# Patient Record
Sex: FEMALE | Race: WHITE
[De-identification: names, ages, dates, MRNs, and addresses within clinical notes are randomized per-mention and may not be internally consistent; named-entity substitution may affect disease eponyms.]

---

## 2018-03-09 ENCOUNTER — HOSPITAL ENCOUNTER (EMERGENCY)
Dept: HOSPITAL 47 - EC | Age: 83
LOS: 1 days | Discharge: HOME | End: 2018-03-10
Payer: MEDICARE

## 2018-03-09 VITALS — RESPIRATION RATE: 18 BRPM

## 2018-03-09 DIAGNOSIS — F41.9: Primary | ICD-10-CM

## 2018-03-09 DIAGNOSIS — Z91.048: ICD-10-CM

## 2018-03-09 DIAGNOSIS — I25.10: ICD-10-CM

## 2018-03-09 DIAGNOSIS — E07.9: ICD-10-CM

## 2018-03-09 DIAGNOSIS — K21.9: ICD-10-CM

## 2018-03-09 DIAGNOSIS — I11.9: ICD-10-CM

## 2018-03-09 DIAGNOSIS — Z86.73: ICD-10-CM

## 2018-03-09 DIAGNOSIS — E78.5: ICD-10-CM

## 2018-03-09 DIAGNOSIS — Z79.82: ICD-10-CM

## 2018-03-09 DIAGNOSIS — R11.0: ICD-10-CM

## 2018-03-09 DIAGNOSIS — Z88.0: ICD-10-CM

## 2018-03-09 DIAGNOSIS — I25.2: ICD-10-CM

## 2018-03-09 DIAGNOSIS — Z79.899: ICD-10-CM

## 2018-03-09 DIAGNOSIS — E78.00: ICD-10-CM

## 2018-03-09 DIAGNOSIS — R07.9: ICD-10-CM

## 2018-03-09 LAB
ALBUMIN SERPL-MCNC: 4 G/DL (ref 3.5–5)
ALP SERPL-CCNC: 72 U/L (ref 38–126)
ALT SERPL-CCNC: 41 U/L (ref 9–52)
ANION GAP SERPL CALC-SCNC: 10 MMOL/L
APTT BLD: 22 SEC (ref 22–30)
AST SERPL-CCNC: 54 U/L (ref 14–36)
BASOPHILS # BLD AUTO: 0 K/UL (ref 0–0.2)
BASOPHILS NFR BLD AUTO: 0 %
BUN SERPL-SCNC: 17 MG/DL (ref 7–17)
CALCIUM SPEC-MCNC: 9.1 MG/DL (ref 8.4–10.2)
CHLORIDE SERPL-SCNC: 104 MMOL/L (ref 98–107)
CK SERPL-CCNC: 55 U/L (ref 30–135)
CO2 SERPL-SCNC: 24 MMOL/L (ref 22–30)
EOSINOPHIL # BLD AUTO: 0.1 K/UL (ref 0–0.7)
EOSINOPHIL NFR BLD AUTO: 1 %
ERYTHROCYTE [DISTWIDTH] IN BLOOD BY AUTOMATED COUNT: 4.76 M/UL (ref 3.8–5.4)
ERYTHROCYTE [DISTWIDTH] IN BLOOD: 12.3 % (ref 11.5–15.5)
GLUCOSE SERPL-MCNC: 127 MG/DL (ref 74–99)
HCT VFR BLD AUTO: 40.6 % (ref 34–46)
HGB BLD-MCNC: 13.6 GM/DL (ref 11.4–16)
INR PPP: 1.1 (ref ?–1.2)
LYMPHOCYTES # SPEC AUTO: 1 K/UL (ref 1–4.8)
LYMPHOCYTES NFR SPEC AUTO: 18 %
MAGNESIUM SPEC-SCNC: 2 MG/DL (ref 1.6–2.3)
MCH RBC QN AUTO: 28.7 PG (ref 25–35)
MCHC RBC AUTO-ENTMCNC: 33.6 G/DL (ref 31–37)
MCV RBC AUTO: 85.3 FL (ref 80–100)
MONOCYTES # BLD AUTO: 0.4 K/UL (ref 0–1)
MONOCYTES NFR BLD AUTO: 7 %
NEUTROPHILS # BLD AUTO: 4.1 K/UL (ref 1.3–7.7)
NEUTROPHILS NFR BLD AUTO: 73 %
PLATELET # BLD AUTO: 170 K/UL (ref 150–450)
POTASSIUM SERPL-SCNC: 3.9 MMOL/L (ref 3.5–5.1)
PROT SERPL-MCNC: 6.6 G/DL (ref 6.3–8.2)
PT BLD: 10.8 SEC (ref 9–12)
SODIUM SERPL-SCNC: 138 MMOL/L (ref 137–145)
TROPONIN I SERPL-MCNC: <0.012 NG/ML (ref 0–0.03)
WBC # BLD AUTO: 5.7 K/UL (ref 3.8–10.6)

## 2018-03-09 PROCEDURE — 71046 X-RAY EXAM CHEST 2 VIEWS: CPT

## 2018-03-09 PROCEDURE — 85610 PROTHROMBIN TIME: CPT

## 2018-03-09 PROCEDURE — 80053 COMPREHEN METABOLIC PANEL: CPT

## 2018-03-09 PROCEDURE — 82553 CREATINE MB FRACTION: CPT

## 2018-03-09 PROCEDURE — 83735 ASSAY OF MAGNESIUM: CPT

## 2018-03-09 PROCEDURE — 96375 TX/PRO/DX INJ NEW DRUG ADDON: CPT

## 2018-03-09 PROCEDURE — 84484 ASSAY OF TROPONIN QUANT: CPT

## 2018-03-09 PROCEDURE — 82550 ASSAY OF CK (CPK): CPT

## 2018-03-09 PROCEDURE — 85730 THROMBOPLASTIN TIME PARTIAL: CPT

## 2018-03-09 PROCEDURE — 85025 COMPLETE CBC W/AUTO DIFF WBC: CPT

## 2018-03-09 PROCEDURE — 96374 THER/PROPH/DIAG INJ IV PUSH: CPT

## 2018-03-09 PROCEDURE — 36415 COLL VENOUS BLD VENIPUNCTURE: CPT

## 2018-03-09 PROCEDURE — 99285 EMERGENCY DEPT VISIT HI MDM: CPT

## 2018-03-09 PROCEDURE — 93005 ELECTROCARDIOGRAM TRACING: CPT

## 2018-03-09 NOTE — ED
General Adult HPI





- General


Chief complaint: Chest Pain


Stated complaint: Weakness/Tremors


Time Seen by Provider: 18 21:12


Source: patient, RN notes reviewed, old records reviewed


Mode of arrival: EMS


Limitations: no limitations





- History of Present Illness


Initial comments: 





89-year-old female to the ER with multiple nonspecific complaints.  Patient 

complaints of lightheadedness and dizziness and mild trembling.  She has 

multiple medical issues including cardiac disease.  Also source of high blood 

pressure high cholesterol.  Patient having mild chest pain.  Patient states she 

was doing nothing abnormal watching TV with no symptoms or complaints.  Blood 

pressure began to fluctuate a little bit, she felt dizzy and had some tingling 

in her arms.  At this time  states some tingling in her right arm, some 

nausea, denies chest pain or any pain in general.  Recent fever cough or 

congestion, patient states she's been feeling well as of late





- Related Data


 Home Medications











 Medication  Instructions  Recorded  Confirmed


 


ALPRAZolam [Xanax] 0.5 mg PO HS PRN 16


 


Aspirin 162 mg PO HS 16


 


Hydrocodone/Acetaminophen [Lortab 1 tab PO Q6HR PRN 16





7.5-325 mg Tablet]   


 


Isosorbide Mononitrate ER [Imdur] 30 mg PO BID 16


 


Metoprolol Tartrate [Lopressor] 50 mg PO BID 16


 


Omeprazole [PriLOSEC] 20 mg PO AC-BRKFST 16


 


Rosuvastatin Calcium [Crestor] 5 mg PO QAM 16


 


amLODIPine [Norvasc] 5 mg PO BID 16


 


Cholecalciferol [Vitamin D3] 2,000 unit PO HS 16


 


Levothyroxine Sodium [Synthroid] 137 mcg PO [id number] 10/13/16 03/09/18


 


Valsartan [Diovan] 160 mg PO BID 10/13/16 03/09/18


 


FLUoxetine HCL [PROzac] 10 mg PO DAILY 18











 Allergies











Allergy/AdvReac Type Severity Reaction Status Date / Time


 


dog dander Allergy  Unknown Verified 18 21:28


 


Penicillins Allergy  Unknown Verified 18 21:28














Review of Systems


ROS Statement: 


Those systems with pertinent positive or pertinent negative responses have been 

documented in the HPI.





ROS Other: All systems not noted in ROS Statement are negative.





Past Medical History


Past Medical History: Coronary Artery Disease (CAD), GERD/Reflux, Hyperlipidemia

, Hypertension, Myocardial Infarction (MI), Osteoarthritis (OA), Thyroid 

Disorder


Additional Past Medical History / Comment(s): glaucoma, hernia, hypercholetremia

, diverticulousis, back pain, CAD status post PCI, hypertension and 

hypertensive cardiovascular disease, osteoarthritis, recurrent TIA.


Last Myocardial Infarction Date:: 


History of Any Multi-Drug Resistant Organisms: None Reported


Past Surgical History: Back Surgery, Bladder Surgery, Hysterectomy, Orthopedic 

Surgery


Additional Past Surgical History / Comment(s): Left heart catheterization with 

PCI 3, bilateral cataract surgery, right glaucoma surgery, bladder suspension 

2, initial partial hysterectomy, followed by oophorectomy with salpingectomy, 

then the rest of salpingectomy and oophorectomy, colonoscopy 5 years ago showed 

diverticulosis,


Past Anesthesia/Blood Transfusion Reactions: No Reported Reaction


Past Psychological History: Anxiety


Smoking Status: Never smoker


Past Alcohol Use History: None Reported


Past Drug Use History: None Reported





- Past Family History


  ** Father


Family Medical History: No Reported History (Father  at age of 71 from 

alcohol was in)





  ** Mother


Family Medical History: Vascular Disorder (Mother  at age of 84 from old 

age after she had suffered from competition after carotid endarterectomy.)





  ** Son(s)


Family Medical History: No Reported History (Patient has one son no major 

medical problems)





General Exam


Limitations: no limitations


General appearance: alert, in no apparent distress


Head exam: Present: atraumatic, normocephalic, normal inspection


Eye exam: Present: normal appearance, PERRL, EOMI.  Absent: scleral icterus, 

conjunctival injection, periorbital swelling


ENT exam: Present: normal exam, mucous membranes moist


Neck exam: Present: normal inspection.  Absent: tenderness, meningismus, 

lymphadenopathy


Respiratory exam: Present: normal lung sounds bilaterally.  Absent: respiratory 

distress, wheezes, rales, rhonchi, stridor


Cardiovascular Exam: Present: regular rate, normal rhythm, normal heart sounds.

  Absent: systolic murmur, diastolic murmur, rubs, gallop, clicks


GI/Abdominal exam: Present: soft, normal bowel sounds.  Absent: distended, 

tenderness, guarding, rebound, rigid


Extremities exam: Present: normal inspection, full ROM, normal capillary 

refill.  Absent: tenderness, pedal edema, joint swelling, calf tenderness


Back exam: Present: normal inspection


Neurological exam: Present: alert, oriented X3, CN II-XII intact


Psychiatric exam: Present: normal affect, normal mood


Skin exam: Present: warm, dry, intact, normal color.  Absent: rash





Course


 Vital Signs











  18





  21:16 22:49


 


Temperature 98.5 F 


 


Pulse Rate 64 80


 


Respiratory 18 18





Rate  


 


Blood Pressure 157/73 129/66


 


O2 Sat by Pulse 97 95





Oximetry  














- Reevaluation(s)


Reevaluation #1: 





18 23:14


Medical records reviewed


Reevaluation #2: 





18 23:14


Patient's nausea is improved


Reevaluation #3: 





03/10/18 00:11


Patient still denies any complaints like to be discharged home





EKG Findings





- EKG Comments:


EKG Findings:: EKG shows normal sinus rhythm rate of 63, , QRS 92, QTc 411





Medical Decision Making





- Medical Decision Making





87 female the ER nonspecific symptoms, patient feeling better, history of 

similar ER visits for same.  No chest pain feeling good and would like to be 

discharged home





- Lab Data


Result diagrams: 


 18 21:15





 18 21:15


 Lab Results











  18 Range/Units





  21:15 21:15 21:15 


 


WBC   5.7   (3.8-10.6)  k/uL


 


RBC   4.76   (3.80-5.40)  m/uL


 


Hgb   13.6   (11.4-16.0)  gm/dL


 


Hct   40.6   (34.0-46.0)  %


 


MCV   85.3   (80.0-100.0)  fL


 


MCH   28.7   (25.0-35.0)  pg


 


MCHC   33.6   (31.0-37.0)  g/dL


 


RDW   12.3   (11.5-15.5)  %


 


Plt Count   170   (150-450)  k/uL


 


Neutrophils %   73   %


 


Lymphocytes %   18   %


 


Monocytes %   7   %


 


Eosinophils %   1   %


 


Basophils %   0   %


 


Neutrophils #   4.1   (1.3-7.7)  k/uL


 


Lymphocytes #   1.0   (1.0-4.8)  k/uL


 


Monocytes #   0.4   (0-1.0)  k/uL


 


Eosinophils #   0.1   (0-0.7)  k/uL


 


Basophils #   0.0   (0-0.2)  k/uL


 


PT     (9.0-12.0)  sec


 


INR     (<1.2)  


 


APTT     (22.0-30.0)  sec


 


Sodium    138  (137-145)  mmol/L


 


Potassium    3.9  (3.5-5.1)  mmol/L


 


Chloride    104  ()  mmol/L


 


Carbon Dioxide    24  (22-30)  mmol/L


 


Anion Gap    10  mmol/L


 


BUN    17  (7-17)  mg/dL


 


Creatinine    0.60  (0.52-1.04)  mg/dL


 


Est GFR (CKD-EPI)AfAm    >90  (>60 ml/min/1.73 sqM)  


 


Est GFR (CKD-EPI)NonAf    82  (>60 ml/min/1.73 sqM)  


 


Glucose    127 H  (74-99)  mg/dL


 


Calcium    9.1  (8.4-10.2)  mg/dL


 


Magnesium    2.0  (1.6-2.3)  mg/dL


 


Total Bilirubin    0.8  (0.2-1.3)  mg/dL


 


AST    54 H  (14-36)  U/L


 


ALT    41  (9-52)  U/L


 


Alkaline Phosphatase    72  ()  U/L


 


Total Creatine Kinase  55    ()  U/L


 


CK-MB (CK-2)  0.5    (0.0-2.4)  ng/mL


 


CK-MB (CK-2) Rel Index  0.9    


 


Troponin I  <0.012    (0.000-0.034)  ng/mL


 


Total Protein    6.6  (6.3-8.2)  g/dL


 


Albumin    4.0  (3.5-5.0)  g/dL














  18 Range/Units





  21:15 


 


WBC   (3.8-10.6)  k/uL


 


RBC   (3.80-5.40)  m/uL


 


Hgb   (11.4-16.0)  gm/dL


 


Hct   (34.0-46.0)  %


 


MCV   (80.0-100.0)  fL


 


MCH   (25.0-35.0)  pg


 


MCHC   (31.0-37.0)  g/dL


 


RDW   (11.5-15.5)  %


 


Plt Count   (150-450)  k/uL


 


Neutrophils %   %


 


Lymphocytes %   %


 


Monocytes %   %


 


Eosinophils %   %


 


Basophils %   %


 


Neutrophils #   (1.3-7.7)  k/uL


 


Lymphocytes #   (1.0-4.8)  k/uL


 


Monocytes #   (0-1.0)  k/uL


 


Eosinophils #   (0-0.7)  k/uL


 


Basophils #   (0-0.2)  k/uL


 


PT  10.8  (9.0-12.0)  sec


 


INR  1.1  (<1.2)  


 


APTT  22.0  (22.0-30.0)  sec


 


Sodium   (137-145)  mmol/L


 


Potassium   (3.5-5.1)  mmol/L


 


Chloride   ()  mmol/L


 


Carbon Dioxide   (22-30)  mmol/L


 


Anion Gap   mmol/L


 


BUN   (7-17)  mg/dL


 


Creatinine   (0.52-1.04)  mg/dL


 


Est GFR (CKD-EPI)AfAm   (>60 ml/min/1.73 sqM)  


 


Est GFR (CKD-EPI)NonAf   (>60 ml/min/1.73 sqM)  


 


Glucose   (74-99)  mg/dL


 


Calcium   (8.4-10.2)  mg/dL


 


Magnesium   (1.6-2.3)  mg/dL


 


Total Bilirubin   (0.2-1.3)  mg/dL


 


AST   (14-36)  U/L


 


ALT   (9-52)  U/L


 


Alkaline Phosphatase   ()  U/L


 


Total Creatine Kinase   ()  U/L


 


CK-MB (CK-2)   (0.0-2.4)  ng/mL


 


CK-MB (CK-2) Rel Index   


 


Troponin I   (0.000-0.034)  ng/mL


 


Total Protein   (6.3-8.2)  g/dL


 


Albumin   (3.5-5.0)  g/dL














- Radiology Data


Radiology results: report reviewed (Chest x-rays negative for acute disease), 

image reviewed





Disposition


Clinical Impression: 


 Chest pain, Anxiety, Hypertension





Disposition: HOME SELF-CARE


Condition: Good


Instructions:  Chest Pain (ED), Hypertension (ED)


Referrals: 


Casie Solo MD [Primary Care Provider] - 1-2 days

## 2018-03-09 NOTE — XR
EXAMINATION TYPE: XR chest 2V

 

DATE OF EXAM: 3/9/2018

 

COMPARISON: 10/13/2016

 

HISTORY: Cough

 

TECHNIQUE:  Frontal and lateral views of the chest are obtained.

 

FINDINGS:  There is no heart failure nor confluent pneumonic infiltrate. Thoracic aorta is atheromato
us. There are chest leads. Costophrenic angles are clear. The bony thorax is intact.

 

IMPRESSION:  No active cardiopulmonary disease. Atheromatous aorta. No change.

## 2018-03-10 VITALS — HEART RATE: 72 BPM | DIASTOLIC BLOOD PRESSURE: 76 MMHG | SYSTOLIC BLOOD PRESSURE: 108 MMHG | TEMPERATURE: 98.3 F

## 2018-05-18 ENCOUNTER — HOSPITAL ENCOUNTER (OUTPATIENT)
Dept: HOSPITAL 47 - RADUSWWP | Age: 83
Discharge: HOME | End: 2018-05-18
Payer: MEDICARE

## 2018-05-18 DIAGNOSIS — R92.2: Primary | ICD-10-CM

## 2018-05-18 PROCEDURE — 77065 DX MAMMO INCL CAD UNI: CPT

## 2018-05-21 NOTE — MM
Reason for exam: additional evaluation requested from abnormal screening.

Last mammogram was performed 17 years and 2 months ago.



History:

Patient is postmenopausal.



Physical Findings:

Nurse did not find any significant physical abnormalities on exam.



MG Work Up Mamm w CAD LT

LM and spot compression MLO view(s) were taken of the left breast.

Prior study comparison: April 27, 2018, mammogram, performed at [facility].  June 9, 2009, mammogram, performed at [facility].

The breast tissue is heterogeneously dense. This may lower the sensitivity of 

mammography.  No suspicious abnormality. Inferior retroareolar asymmetry is 

present on the exam on 2009.



These results were verbally communicated with the patient and result sheet given 

to the patient on 5/18/18.





ASSESSMENT: Benign, BI-RAD 2



RECOMMENDATION:

Routine screening mammogram of both breasts in 1 year.

## 2018-05-21 NOTE — USB
Reason for exam: clinical finding.



History:

Patient is postmenopausal.



Physical Findings:

Nurse did not find any significant physical abnormalities on exam.



US Breast LT

Left complete breast ultrasound includes all four quadrants, the retroareolar 

region and axilla. Finding demonstrates no cystic or solid lesion seen.



These results were verbally communicated with the patient and result sheet given 

to the patient on 5/18/18.





ASSESSMENT: Incomplete: need additional imaging evaluation, BI-RAD 0



RECOMMENDATION:

Special view mammogram of the left breast.

(spot MLO and lateral)

## 2018-11-27 ENCOUNTER — HOSPITAL ENCOUNTER (OUTPATIENT)
Dept: HOSPITAL 47 - RADUSMAIN | Age: 83
End: 2018-11-27
Attending: INTERNAL MEDICINE
Payer: MEDICARE

## 2018-11-27 DIAGNOSIS — I65.23: Primary | ICD-10-CM

## 2018-11-27 PROCEDURE — 93306 TTE W/DOPPLER COMPLETE: CPT

## 2018-11-27 PROCEDURE — 93880 EXTRACRANIAL BILAT STUDY: CPT

## 2018-11-27 NOTE — US
EXAMINATION TYPE: US carotid duplex BILAT

 

DATE OF EXAM: 11/27/2018

 

COMPARISON: MRA head/ MRI brain, US

 

CLINICAL HISTORY: I65.23 stenosis/occlusion carotid. TIA

 

EXAM MEASUREMENTS: 

 

RIGHT:  Peak Systolic Velocity (PSV) cm/sec

----- Right CCA:  57.9  

----- Right ICA:  62.3     

----- Right ECA:  188.3 prox   

ICA/CCA ratio:  1.1    

 

RIGHT:  End Diastole cm/sec

----- Right CCA:  13.9   

----- Right ICA:  17.2      

----- Right ECA:  13.1     

 

LEFT:  Peak Systolic Velocity (PSV) cm/sec

----- Left CCA:  49.1  

----- Left ICA:  45.9   

----- Left ECA:  156.2 mid  

ICA/CCA ratio:  0.9  

 

LEFT:  End Diastole cm/sec

----- Left CCA:  10.1  

----- Left ICA:  15.4   

----- Left ECA:  10.7 

 

VERTEBRALS (direction of flow):

Right Vertebral: no color flow seen in neck area for PW Doppler placement

Left Vertebral: Antegrade and enlarged vertebral artery with color flow well seen

 

Rhythm:  Normal

 

Moderate, irregular and calcified wall plaque is seen in bilateral carotid bifurcation with abnormall
y elevated PSV in bilateral ECA at and distal to noted wall plaque. Dominant left vertebral artery is
 also noted. 

 

 

 

IMPRESSION:  

1. 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  

2. Significant flow-limiting stenosis within the internal carotid arteries is not identified.

3. There is some mild intimal common carotid vessels. 

4. Nonvisualization right vertebral artery

 

 

Criteria for Assigning % of Stenosis / Diameter reduction

(Estimation based on the indirect measurements of the internal carotid artery velocities (ICA PSV).

1.  Normal (no stenosis)=ICA PSV < 125 cm/s: ratio < 2.0: ICA EDV<40 cm/s.

2. Less than 50% stenosis=ICA PSV < 125 cm/s: ratio < 2.0: ICA EDV<40 cm/s.

3.  50 to 69% stenosis=ICA PSV of 125 to 230 cm/s: ration 2.0 ? 4.0: ICA EDV  cm/s.

4.  Greater than 70% stenosis to near occlusion= ICA PSV > 230 cm/s: ratio > 4.0: ICA EDV > 100 cm/s.
 

5.  Near occlusion= ICA PSV velocities may be low or undetectable: variable ratio and ICA EDV.

6.  Total occlusion=unable to detect flow.

## 2018-11-28 NOTE — ECHOF
Referral Reason:Atherosclerotic heart disease



MEASUREMENTS

--------

HEIGHT: 162.6 cm

WEIGHT: 86.6 kg

BP: 126/69

RVIDd:   3.3 cm     (< 3.3)

IVSd:   1.2 cm     (0.6 - 1.1)

LVIDd:   4.3 cm     (3.9 - 5.3)

LVPWd:   1.2 cm     (0.6 - 1.1)

IVSs:   1.6 cm

LVIDs:   3.2 cm

LVPWs:   1.4 cm

LA Diam:   3.5 cm     (2.7 - 3.8)

LAESV Index (A-L):   35.47 ml/m

Ao Diam:   3.3 cm     (2.0 - 3.7)

AV Cusp:   2.2 cm     (1.5 - 2.6)

MV EXCURSION:   19.089 mm     (> 18.000)

MV EF SLOPE:   36 mm/s     (70 - 150)

EPSS:   1.0 cm

MV E Guanaco:   0.91 m/s

MV DecT:   245 ms

MV A [place]:   0.83 m/s

MV E/A Ratio:   1.10 

AR PHT:   955 ms

RAP:   5.00 mmHg

RVSP:   33.70 mmHg







FINDINGS

--------

Sinus rhythm.

This was a technically adequate study.

The left ventricular size is normal.   There is borderline concentric left ventricular hypertrophy.  
 Overall left ventricular systolic function is normal with, an EF between 55 - 60 %.

The right ventricle is mildly enlarged.

LA is moderately dilated 34-39 ml/m2

The right atrium is normal in size.

There is mild aortic valve sclerosis.   There is mild aortic regurgitation.

The mitral valve is normal.   Mild mitral regurgitation is present.

Mild tricuspid regurgitation present.   Right ventricular systolic pressure is normal at < 35 mmHg.

Trace/mild (physiologic)  pulmonic regurgitation.

The aortic root size is normal.

Normal inferior vena cava with normal inspiratory collapse consistent with estimated right atrial pre
ssure of  5 mmHg.

There is no pericardial effusion.



CONCLUSIONS

--------

1. Sinus rhythm.

2. This was a technically adequate study.

3. The left ventricular size is normal.

4. There is borderline concentric left ventricular hypertrophy.

5. Overall left ventricular systolic function is normal with, an EF between 55 - 60 %.

6. The right ventricle is mildly enlarged.

7. LA is moderately dilated 34-39 ml/m2

8. There is mild aortic valve sclerosis.

9. There is mild aortic regurgitation.

10. Mild mitral regurgitation is present.

11. Mild tricuspid regurgitation present.

12. Right ventricular systolic pressure is normal at < 35 mmHg.

13. Trace/mild (physiologic)  pulmonic regurgitation.

14. The aortic root size is normal.

15. Normal inferior vena cava with normal inspiratory collapse consistent with estimated right atrial
 pressure of  5 mmHg.

16. There is no pericardial effusion.





SONOGRAPHER: Helena Jc RDCS

## 2019-12-17 ENCOUNTER — HOSPITAL ENCOUNTER (OUTPATIENT)
Dept: HOSPITAL 47 - RADECHMAIN | Age: 84
Discharge: HOME | End: 2019-12-17
Attending: INTERNAL MEDICINE
Payer: MEDICARE

## 2019-12-17 DIAGNOSIS — I08.1: Primary | ICD-10-CM

## 2019-12-17 PROCEDURE — 93306 TTE W/DOPPLER COMPLETE: CPT

## 2019-12-18 NOTE — ECHOF
Referral Reason:I25.10 Atherosclerotic heart disease of native cor



MEASUREMENTS

--------

HEIGHT: 165.1 cm

WEIGHT: 88.9 kg

BP: 

RVIDd:   3.5 cm     (< 3.3)

IVSd:   1.1 cm     (0.6 - 1.1)

LVIDd:   4.5 cm     (3.9 - 5.3)

LVPWd:   1.1 cm     (0.6 - 1.1)

IVSs:   1.6 cm

LVIDs:   3.1 cm

LVPWs:   1.4 cm

LA Diam:   4.1 cm     (2.7 - 3.8)

LAESV Index (A-L):   27.65 ml/m

Ao Diam:   2.9 cm     (2.0 - 3.7)

AV Cusp:   1.4 cm     (1.5 - 2.6)

LA Diam:   3.6 cm     (2.7 - 3.8)

MV EXCURSION:   16.659 mm     (> 18.000)

MV EF SLOPE:   71 mm/s     (70 - 150)

EPSS:   0.6 cm

MV E Guanaco:   0.57 m/s

MV DecT:   192 ms

MV A Gu[place]aco:   0.36 m/s

MV E/A Ratio:   1.57 

RAP:   5.00 mmHg

RVSP:   25.10 mmHg







FINDINGS

--------

Sinus rhythm.

This was a technically good study.

LV size, wall thickness and systolic function are normal, with an EF greater than 55%.   The left carlyn
tricular size is normal.   Overall left ventricular systolic function is mildly impaired with, an EF 
between 45 - 50 %.

The right ventricle is normal in size.

The left atrial size is normal.

The right atrial size is normal.

There is mild aortic valve sclerosis.   There is no evidence of aortic regurgitation.

Mild mitral annular calcification present.   Mild mitral regurgitation is present.

Mild tricuspid regurgitation present.   Right ventricular systolic pressure is normal at < 35 mmHg.  
 There is no evidence of pulmonary hypertension.

There is no pulmonic regurgitation present.

The aortic root size is normal.

There is no pericardial effusion.



CONCLUSIONS

--------

1. Sinus rhythm.

2. This was a technically good study.

3. LV size, wall thickness and systolic function are normal, with an EF greater than 55%.

4. The left ventricular size is normal.

5. Overall left ventricular systolic function is mildly impaired with, an EF between 45 - 50 %.

6. The right ventricle is normal in size.

7. The left atrial size is normal.

8. The right atrial size is normal.

9. There is mild aortic valve sclerosis.

10. Mild mitral annular calcification present.

11. Mild mitral regurgitation is present.

12. Mild tricuspid regurgitation present.

13. Right ventricular systolic pressure is normal at < 35 mmHg.

14. There is no evidence of pulmonary hypertension.

15. There is no pulmonic regurgitation present.

16. The aortic root size is normal.

17. There is no pericardial effusion.





SONOGRAPHER: Genie Ayala RDCS

## 2019-12-27 ENCOUNTER — HOSPITAL ENCOUNTER (OUTPATIENT)
Dept: HOSPITAL 47 - RADNMMAIN | Age: 84
Discharge: HOME | End: 2019-12-27
Attending: INTERNAL MEDICINE
Payer: MEDICARE

## 2019-12-27 DIAGNOSIS — I25.10: Primary | ICD-10-CM

## 2019-12-27 PROCEDURE — 93017 CV STRESS TEST TRACING ONLY: CPT

## 2019-12-27 PROCEDURE — 78452 HT MUSCLE IMAGE SPECT MULT: CPT

## 2019-12-27 NOTE — NM
EXAMINATION TYPE: NM stress lexiscan cardiolite

 

DATE OF EXAM: 12/27/2019

 

COMPARISON: NONE

 

HISTORY: History of hypertension and hypercholesterolemia with prior TIA, prior catheterization with 
3 vessel angioplasty presents with abnormal EKG

 

TECHNIQUE:  After the intravenous administration of 10.39 mCi Tc 99m Sestamibi - Cardiolite resting S
PECT images acquired 45 minutes post injection. 

 

The patient received 0.4mg Lexiscan, 26.2 mCi Tc 99m Sestamibi - Stress images obtained 40 minutes po
st injection 

 

FINDINGS:  

 

Review of stress and rest SPECT images demonstrates no distinct perfusion abnormality.  Gated analysi
s shows normal wall motion with an estimated left ventricular ejection fraction of 51 %.

 

 

 

IMPRESSION:  

 

No scintigraphic evidence for reversible ischemia.

## 2019-12-27 NOTE — EST
EXERCISE STRESS



AGE:

89



SEX:

Female



HT:

65"



WT:

195



PROTOCOL:

Lexiscan/Cardiolite



STAGE:



DURATION OF EXERCISE:



HEART RATE REST:

67



BLOOD PRESSURE REST:

136/84



MAXIMUM HEART RATE ACHIEVED:

87



MAXIMUM BLOOD PRESSURE:

137/81



85% MPHR:

111



100% MPHR:

131



METS:



INDICATIONS:

Abnormal EKG.



CLINICAL INFORMATION:

Baseline rhythm is a sinus mechanism, rate of 67, normal axis and intervals.  T-wave

inversion in the anterolateral leads.  Poor R-wave progression. Baseline blood pressure

136/84. The patient received injection of Lexiscan. Electrocardiograph monitoring

revealed no evidence of diagnostic ischemic ST deviation.  Cardiolite was injected per

protocol.



CONCLUSION:

1. Nondiagnostic electrocardiograph stress testing.

2. Nuclear images will be reported separately.





MMODL / IJN: [id number] / Job#: [id number]